# Patient Record
Sex: MALE | Race: WHITE | NOT HISPANIC OR LATINO
[De-identification: names, ages, dates, MRNs, and addresses within clinical notes are randomized per-mention and may not be internally consistent; named-entity substitution may affect disease eponyms.]

---

## 2017-01-13 ENCOUNTER — APPOINTMENT (OUTPATIENT)
Dept: OTOLARYNGOLOGY | Facility: CLINIC | Age: 32
End: 2017-01-13

## 2017-06-30 ENCOUNTER — APPOINTMENT (OUTPATIENT)
Dept: OTOLARYNGOLOGY | Facility: CLINIC | Age: 32
End: 2017-06-30

## 2017-09-27 ENCOUNTER — APPOINTMENT (OUTPATIENT)
Dept: OTOLARYNGOLOGY | Facility: CLINIC | Age: 32
End: 2017-09-27
Payer: COMMERCIAL

## 2017-09-27 VITALS
HEART RATE: 56 BPM | WEIGHT: 178 LBS | DIASTOLIC BLOOD PRESSURE: 77 MMHG | BODY MASS INDEX: 24.92 KG/M2 | SYSTOLIC BLOOD PRESSURE: 114 MMHG | HEIGHT: 71 IN

## 2017-09-27 DIAGNOSIS — D17.9 BENIGN LIPOMATOUS NEOPLASM, UNSPECIFIED: ICD-10-CM

## 2017-09-27 PROCEDURE — 31575 DIAGNOSTIC LARYNGOSCOPY: CPT

## 2017-09-27 PROCEDURE — 69210 REMOVE IMPACTED EAR WAX UNI: CPT

## 2017-09-27 PROCEDURE — 99214 OFFICE O/P EST MOD 30 MIN: CPT | Mod: 25

## 2017-12-01 ENCOUNTER — APPOINTMENT (OUTPATIENT)
Dept: OTOLARYNGOLOGY | Facility: CLINIC | Age: 32
End: 2017-12-01

## 2018-05-30 ENCOUNTER — APPOINTMENT (OUTPATIENT)
Dept: OTOLARYNGOLOGY | Facility: CLINIC | Age: 33
End: 2018-05-30
Payer: COMMERCIAL

## 2018-05-30 VITALS
WEIGHT: 178 LBS | BODY MASS INDEX: 24.92 KG/M2 | SYSTOLIC BLOOD PRESSURE: 103 MMHG | HEIGHT: 71 IN | HEART RATE: 69 BPM | DIASTOLIC BLOOD PRESSURE: 66 MMHG

## 2018-05-30 PROCEDURE — 99214 OFFICE O/P EST MOD 30 MIN: CPT | Mod: 25

## 2018-05-30 PROCEDURE — 69210 REMOVE IMPACTED EAR WAX UNI: CPT

## 2018-09-05 ENCOUNTER — APPOINTMENT (OUTPATIENT)
Dept: OTOLARYNGOLOGY | Facility: CLINIC | Age: 33
End: 2018-09-05
Payer: COMMERCIAL

## 2018-09-05 VITALS
BODY MASS INDEX: 25.2 KG/M2 | WEIGHT: 180 LBS | DIASTOLIC BLOOD PRESSURE: 68 MMHG | HEIGHT: 71 IN | SYSTOLIC BLOOD PRESSURE: 96 MMHG | HEART RATE: 60 BPM

## 2018-09-05 DIAGNOSIS — H91.90 UNSPECIFIED HEARING LOSS, UNSPECIFIED EAR: ICD-10-CM

## 2018-09-05 DIAGNOSIS — H66.90 OTITIS MEDIA, UNSPECIFIED, UNSPECIFIED EAR: ICD-10-CM

## 2018-09-05 DIAGNOSIS — H69.83 OTHER SPECIFIED DISORDERS OF EUSTACHIAN TUBE, BILATERAL: ICD-10-CM

## 2018-09-05 DIAGNOSIS — H61.20 IMPACTED CERUMEN, UNSPECIFIED EAR: ICD-10-CM

## 2018-09-05 PROCEDURE — 69210 REMOVE IMPACTED EAR WAX UNI: CPT

## 2018-09-05 PROCEDURE — 99214 OFFICE O/P EST MOD 30 MIN: CPT | Mod: 25

## 2020-12-05 ENCOUNTER — TRANSCRIPTION ENCOUNTER (OUTPATIENT)
Age: 35
End: 2020-12-05

## 2022-05-19 ENCOUNTER — APPOINTMENT (OUTPATIENT)
Dept: PLASTIC SURGERY | Facility: CLINIC | Age: 37
End: 2022-05-19

## 2022-10-17 ENCOUNTER — APPOINTMENT (OUTPATIENT)
Dept: CT IMAGING | Facility: CLINIC | Age: 37
End: 2022-10-17

## 2022-10-17 PROCEDURE — 70480 CT ORBIT/EAR/FOSSA W/O DYE: CPT

## 2023-01-25 ENCOUNTER — NON-APPOINTMENT (OUTPATIENT)
Age: 38
End: 2023-01-25

## 2023-08-29 ENCOUNTER — NON-APPOINTMENT (OUTPATIENT)
Age: 38
End: 2023-08-29

## 2023-11-06 ENCOUNTER — NON-APPOINTMENT (OUTPATIENT)
Age: 38
End: 2023-11-06

## 2024-02-20 ENCOUNTER — APPOINTMENT (OUTPATIENT)
Dept: ORTHOPEDIC SURGERY | Facility: CLINIC | Age: 39
End: 2024-02-20
Payer: COMMERCIAL

## 2024-02-20 ENCOUNTER — TRANSCRIPTION ENCOUNTER (OUTPATIENT)
Age: 39
End: 2024-02-20

## 2024-02-20 VITALS
SYSTOLIC BLOOD PRESSURE: 131 MMHG | DIASTOLIC BLOOD PRESSURE: 69 MMHG | HEIGHT: 71 IN | BODY MASS INDEX: 24.5 KG/M2 | HEART RATE: 60 BPM | WEIGHT: 175 LBS

## 2024-02-20 PROCEDURE — 99205 OFFICE O/P NEW HI 60 MIN: CPT

## 2024-02-20 NOTE — DISCUSSION/SUMMARY
[de-identified] : 39yM pw R knee repeat buckethandle tear of medial meniscus, ACL sprain.   The risks, benefits and alternatives to surgery were discussed.   We discussed that studies demonstrate the chondroprotective impact of an intact meniscus and that debridement of tissues may accelerate arthritis.  We discussed the short-term benefits often gained in pain control in debriding degenerative tears but that existing pain caused by cartilage loss will not be addressed in the scope of this surgery.  We explicitly discussed that an arthroscopic procedure is not guaranteed to improve his symptoms.  The patient understands the risks include but are not limited to bleeding, infection, wound healing issues, osteonecrosis of the knee, damage to surrounding structures including nerves and arteries, the need for revision surgery and retear, symptomatic hardware and the inability of the surgery to reduce the pain, arthritis and arthrosis.  No guarantees were given regarding the surgery.  They understand there is a risk of loss of limb, extremity and life.  We discussed the outcomes of revision meniscus repairs and that a future debridement may be required, however to protect his chondral health that my preference would be to conduct a revision repair.   We discussed the proposed rehabilitation timeline as well as expected postoperative restrictions. The patient voiced a good understanding of treatment options, risks and benefits, postoperative instructions, rehabilitation timeline, and restrictions. The patient was given the opportunity to ask questions, which were all answered to the best of my ability and to their satisfaction. The patient will work with my office to arrange a time for surgery and obtain any medical clearance information necessary. The patient expressed understanding of his diagnosis and treatment plan and all questions were answered.   I have personally obtained the history, reviewed the ROS as noted, and performed the physical examination today.  The patient and I discussed the assessment and options and developed the plan.  All questions were answered and the patient stated their understanding of the treatment plan and appreciation of the visit.  My cumulative time spent on this patient's visit included over 60 minutes: Preparation for the visit, review of the medical records, review of pertinent diagnostic studies, examination and counseling of the patient on the above diagnosis, treatment plan and prognosis, orders of diagnostic tests, medications and/or appropriate procedures and documentation in the medical records of today's visit.   Romain Thomas MD

## 2024-02-20 NOTE — HISTORY OF PRESENT ILLNESS
[de-identified] : 39y healthy M w hx of R knee medial meniscus repair 2020 Dr. Jack Walters pw R knee pain.  He is very active in [employer] and initially felt a pop with a locked knee in 2020 - he brings his operative report which notes 2 sutures utilized to repair the meniscus although he remembers from discussions with the surgery that a partial debridement was also conducted.  He was immediately WB and working with PT and while he had some clicking, he was doing fine until 2 weeks ago when he felt a repeat popping within his knee.  He feels locking/buckling, no n/p.  He had an MRI 1w ago demonstrating a repeat buckethandle tear, ACL sprain.

## 2024-02-20 NOTE — PHYSICAL EXAM
[de-identified] : Gen: NAD Resp: Nonlabored respirations, no SOB Gait: antalgic   R Knee: Skin intact small effusion 0-110 AROM Tender MJL 5/5 IP Q EHL TA GS SILT L3-S1 2+ dp pt wwp bcr   1A lachman and (-) pivot shift Grade 1 posterior drawer Stable to v/v at 0 and 30 degrees (-) Dial test at 30, 90 degrees   (+) Joselito, [place] Medial joint pain with shallow 2 leg squat   1+ patellar translation (-) pain with patellar compression/grind (-) J sign (-) apprehension  [de-identified] : I independently reviewed and interpreted the MRI of the R knee.  I discussed with the patient the MRI demonstrates an ACL sprain and medial meniscus buckethandle tear, small effusion and cyst.

## 2024-02-23 ENCOUNTER — OUTPATIENT (OUTPATIENT)
Dept: OUTPATIENT SERVICES | Facility: HOSPITAL | Age: 39
LOS: 1 days | End: 2024-02-23
Payer: COMMERCIAL

## 2024-02-23 VITALS
SYSTOLIC BLOOD PRESSURE: 114 MMHG | TEMPERATURE: 98 F | HEIGHT: 72 IN | WEIGHT: 177.03 LBS | OXYGEN SATURATION: 98 % | HEART RATE: 60 BPM | DIASTOLIC BLOOD PRESSURE: 76 MMHG | RESPIRATION RATE: 14 BRPM

## 2024-02-23 DIAGNOSIS — Z01.818 ENCOUNTER FOR OTHER PREPROCEDURAL EXAMINATION: ICD-10-CM

## 2024-02-23 DIAGNOSIS — M25.561 PAIN IN RIGHT KNEE: ICD-10-CM

## 2024-02-23 DIAGNOSIS — Z98.890 OTHER SPECIFIED POSTPROCEDURAL STATES: Chronic | ICD-10-CM

## 2024-02-23 DIAGNOSIS — J34.2 DEVIATED NASAL SEPTUM: Chronic | ICD-10-CM

## 2024-02-23 DIAGNOSIS — Z90.89 ACQUIRED ABSENCE OF OTHER ORGANS: Chronic | ICD-10-CM

## 2024-02-23 DIAGNOSIS — H72.93 UNSPECIFIED PERFORATION OF TYMPANIC MEMBRANE, BILATERAL: Chronic | ICD-10-CM

## 2024-02-23 DIAGNOSIS — Z96.22 MYRINGOTOMY TUBE(S) STATUS: Chronic | ICD-10-CM

## 2024-02-23 DIAGNOSIS — S83.249A OTHER TEAR OF MEDIAL MENISCUS, CURRENT INJURY, UNSPECIFIED KNEE, INITIAL ENCOUNTER: ICD-10-CM

## 2024-02-23 PROCEDURE — G0463: CPT

## 2024-02-23 NOTE — H&P PST ADULT - NSICDXPASTMEDICALHX_GEN_ALL_CORE_FT
PAST MEDICAL HISTORY:  Deviated septum     [place] (hard of hearing) R >L    Hypertrophy of tonsils     No pertinent past medical history     Perforated ear drum b/l

## 2024-02-23 NOTE — H&P PST ADULT - PROBLEM SELECTOR PLAN 1
right knee arthroscopy meniscus repair vs. debridement, possible microfracture; preop instructions given; to go for medical clearance

## 2024-02-23 NOTE — H&P PST ADULT - NSICDXPASTSURGICALHX_GEN_ALL_CORE_FT
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

## 2024-02-23 NOTE — H&P PST ADULT - NSICDXFAMILYHX_GEN_ALL_CORE_FT
FAMILY HISTORY:  Mother  Still living? Yes, Estimated age: 61-70  FHx: rheumatoid arthritis, Age at diagnosis: Age Unknown

## 2024-02-23 NOTE — H&P PST ADULT - ATTENDING COMMENTS
38yo M w hx of R knee medial meniscus tear pw buckethandle tear - plan for right knee arthroscopy, meniscus repair versus debridement, possible microfracture

## 2024-02-23 NOTE — H&P PST ADULT - HISTORY OF PRESENT ILLNESS
this is a 40 y/o male who had an injury on 2/14/24 and injured right knee; has had pain right knee since then, worse when walking, unable to straighten leg; to have right knee arthroscopy

## 2024-02-26 ENCOUNTER — TRANSCRIPTION ENCOUNTER (OUTPATIENT)
Age: 39
End: 2024-02-26

## 2024-02-27 ENCOUNTER — APPOINTMENT (OUTPATIENT)
Dept: ORTHOPEDIC SURGERY | Facility: HOSPITAL | Age: 39
End: 2024-02-27

## 2024-02-27 ENCOUNTER — TRANSCRIPTION ENCOUNTER (OUTPATIENT)
Age: 39
End: 2024-02-27

## 2024-02-27 ENCOUNTER — OUTPATIENT (OUTPATIENT)
Dept: OUTPATIENT SERVICES | Facility: HOSPITAL | Age: 39
LOS: 1 days | End: 2024-02-27
Payer: COMMERCIAL

## 2024-02-27 VITALS
OXYGEN SATURATION: 98 % | HEART RATE: 67 BPM | DIASTOLIC BLOOD PRESSURE: 65 MMHG | TEMPERATURE: 98 F | SYSTOLIC BLOOD PRESSURE: 117 MMHG | RESPIRATION RATE: 16 BRPM

## 2024-02-27 VITALS
DIASTOLIC BLOOD PRESSURE: 85 MMHG | WEIGHT: 173.94 LBS | OXYGEN SATURATION: 100 % | SYSTOLIC BLOOD PRESSURE: 126 MMHG | TEMPERATURE: 97 F | HEIGHT: 71 IN | RESPIRATION RATE: 16 BRPM | HEART RATE: 70 BPM

## 2024-02-27 DIAGNOSIS — H72.93 UNSPECIFIED PERFORATION OF TYMPANIC MEMBRANE, BILATERAL: Chronic | ICD-10-CM

## 2024-02-27 DIAGNOSIS — Z01.818 ENCOUNTER FOR OTHER PREPROCEDURAL EXAMINATION: ICD-10-CM

## 2024-02-27 DIAGNOSIS — Z90.89 ACQUIRED ABSENCE OF OTHER ORGANS: Chronic | ICD-10-CM

## 2024-02-27 DIAGNOSIS — Z98.890 OTHER SPECIFIED POSTPROCEDURAL STATES: Chronic | ICD-10-CM

## 2024-02-27 DIAGNOSIS — S83.249A OTHER TEAR OF MEDIAL MENISCUS, CURRENT INJURY, UNSPECIFIED KNEE, INITIAL ENCOUNTER: ICD-10-CM

## 2024-02-27 DIAGNOSIS — Z96.22 MYRINGOTOMY TUBE(S) STATUS: Chronic | ICD-10-CM

## 2024-02-27 DIAGNOSIS — J34.2 DEVIATED NASAL SEPTUM: Chronic | ICD-10-CM

## 2024-02-27 PROCEDURE — C1713: CPT

## 2024-02-27 PROCEDURE — ZZZZZ: CPT

## 2024-02-27 PROCEDURE — C9399: CPT

## 2024-02-27 PROCEDURE — 29882 ARTHRS KNE SRG MNISC RPR M/L: CPT | Mod: 22,RT

## 2024-02-27 PROCEDURE — 97161 PT EVAL LOW COMPLEX 20 MIN: CPT

## 2024-02-27 PROCEDURE — 29882 ARTHRS KNE SRG MNISC RPR M/L: CPT | Mod: RT

## 2024-02-27 DEVICE — SYS DVC AIR MENISCAL CURVED UP: Type: IMPLANTABLE DEVICE | Site: RIGHT | Status: FUNCTIONAL

## 2024-02-27 DEVICE — SYS DVC AIR PLUS MENISCAL CURVED DOWN: Type: IMPLANTABLE DEVICE | Site: RIGHT | Status: FUNCTIONAL

## 2024-02-27 RX ORDER — GABAPENTIN 400 MG/1
1 CAPSULE ORAL
Qty: 42 | Refills: 0
Start: 2024-02-27 | End: 2024-03-11

## 2024-02-27 RX ORDER — ASPIRIN/CALCIUM CARB/MAGNESIUM 324 MG
1 TABLET ORAL
Qty: 56 | Refills: 0
Start: 2024-02-27 | End: 2024-03-25

## 2024-02-27 RX ORDER — ONDANSETRON 8 MG/1
4 TABLET, FILM COATED ORAL ONCE
Refills: 0 | Status: DISCONTINUED | OUTPATIENT
Start: 2024-02-27 | End: 2024-02-28

## 2024-02-27 RX ORDER — ACETAMINOPHEN 500 MG
2 TABLET ORAL
Qty: 42 | Refills: 0
Start: 2024-02-27 | End: 2024-03-04

## 2024-02-27 RX ORDER — APREPITANT 80 MG/1
40 CAPSULE ORAL ONCE
Refills: 0 | Status: COMPLETED | OUTPATIENT
Start: 2024-02-27 | End: 2024-02-27

## 2024-02-27 RX ORDER — FENTANYL CITRATE 50 UG/ML
25 INJECTION INTRAVENOUS
Refills: 0 | Status: DISCONTINUED | OUTPATIENT
Start: 2024-02-27 | End: 2024-02-28

## 2024-02-27 RX ORDER — CEFAZOLIN SODIUM 1 G
2000 VIAL (EA) INJECTION ONCE
Refills: 0 | Status: COMPLETED | OUTPATIENT
Start: 2024-02-27 | End: 2024-02-27

## 2024-02-27 RX ORDER — IBUPROFEN 200 MG
1 TABLET ORAL
Qty: 56 | Refills: 0
Start: 2024-02-27 | End: 2024-03-11

## 2024-02-27 RX ORDER — SODIUM CHLORIDE 9 MG/ML
1000 INJECTION, SOLUTION INTRAVENOUS
Refills: 0 | Status: DISCONTINUED | OUTPATIENT
Start: 2024-02-27 | End: 2024-02-28

## 2024-02-27 RX ORDER — CHLORHEXIDINE GLUCONATE 213 G/1000ML
1 SOLUTION TOPICAL ONCE
Refills: 0 | Status: COMPLETED | OUTPATIENT
Start: 2024-02-27 | End: 2024-02-27

## 2024-02-27 RX ADMIN — FENTANYL CITRATE 25 MICROGRAM(S): 50 INJECTION INTRAVENOUS at 15:30

## 2024-02-27 RX ADMIN — CHLORHEXIDINE GLUCONATE 1 APPLICATION(S): 213 SOLUTION TOPICAL at 11:05

## 2024-02-27 RX ADMIN — APREPITANT 40 MILLIGRAM(S): 80 CAPSULE ORAL at 11:04

## 2024-02-27 RX ADMIN — FENTANYL CITRATE 25 MICROGRAM(S): 50 INJECTION INTRAVENOUS at 14:18

## 2024-02-27 RX ADMIN — SODIUM CHLORIDE 75 MILLILITER(S): 9 INJECTION, SOLUTION INTRAVENOUS at 14:13

## 2024-02-27 NOTE — ASU PATIENT PROFILE, ADULT - NS TRANSFER PROSTHESIS:
Discharge Planner PT   Patient plan for discharge: TCU not sure if [facility] or [facility] via medical transport  Current status:Pt amb 200' with ww with SBA with step to gait pattern.  Not met Pt transfers sit to / from stand with CGA . Pt transfers sit to/from supine with min assist with R LE.  Pt transfers bed to/ from chair with ww with SBA. Goals not met  Barriers to return to prior living situation: Pt does not have family able to assist, stairs to enter home and up to bedroom  Recommendations for discharge: TCU per plan established by the PT.    Rationale for recommendations: With continued skilled PT will regain Ind for return home with limited need for assist     PT - Pt discharging to TCU today per Ortho. Collaborated with PT - goals not met - please refer to discharge summary.       Entered by: Radha Sauer 12/10/2018 10:36 AM        N/A

## 2024-02-27 NOTE — BRIEF OPERATIVE NOTE - NSICDXBRIEFPREOP_GEN_ALL_CORE_FT
PRE-OP DIAGNOSIS:  Bucket handle tear of medial meniscus of right knee 27-Feb-2024 13:56:59  Ignacio Lou  
Ambulatory

## 2024-02-27 NOTE — ASU PATIENT PROFILE, ADULT - NS PRO PT RIGHT SUPPORT PERSON
same name as above Complex Repair And Flap Additional Text (Will Appearing After The Standard Complex Repair Text): The complex repair was not sufficient to completely close the primary defect. The remaining additional defect was repaired with the flap mentioned below.

## 2024-02-27 NOTE — ASU DISCHARGE PLAN (ADULT/PEDIATRIC) - ASU DC SPECIAL INSTRUCTIONSFT
Follow Surgeon's Instructions.  Weight Bearing Status: Right Lower Extremity is Non-Weight Bearing in a Knee Immobilizer. Keep leg straight in Extension.   Reduce activities as necessary. Use of assistive devices as necessary.    May ICE operative extremity to help with pain and swelling.  30 min on and 60 min off, several times a day.  Always use a barrier between skin and ice, such as a pillowcase or sheet to protect skin from thermal injury. may open brace when at rest to ice, but close and secure brace prior to any movement    Elevate operative Extremity to help reduce pain and swelling.        Keep Dressing Clean/Dry/Intact.  May Remove Dressing on POD#3. May Shower if no drainage from incision sites.   Brace must Remain dry.  Cover brace with cast bag or double wrapped plastic for protection when showering.         Follow Up in Office in 10-14 days. Surgeon will remove sutures at f/u visit.   Call office to confirm appointment.

## 2024-02-27 NOTE — ASU PATIENT PROFILE, ADULT - NSICDXPASTMEDICALHX_GEN_ALL_CORE_FT
PAST MEDICAL HISTORY:  Deviated septum     [place] (hard of hearing) R >L    Hypertrophy of tonsils     No pertinent past medical history     Perforated ear drum b/l

## 2024-02-27 NOTE — ASU DISCHARGE PLAN (ADULT/PEDIATRIC) - NS MD DC FALL RISK RISK
For information on Fall & Injury Prevention, visit: [email or web address] OR  [email or web address] OR  https://www.cdc.gov/steadi/patient.html

## 2024-02-27 NOTE — PHYSICAL THERAPY INITIAL EVALUATION ADULT - RANGE OF MOTION EXAMINATION, REHAB EVAL
right knee n/t due to surgical precautions/bilateral upper extremity ROM was WNL (within normal limits)/Left LE ROM was WNL (within normal limits)/deficits as listed below

## 2024-02-27 NOTE — ASU PATIENT PROFILE, ADULT - ABILITY TO HEAR (WITH HEARING AID OR HEARING APPLIANCE IF NORMALLY USED):
Bilateral hearing loss/Mildly to Moderately Impaired: difficulty hearing in some environments or speaker may need to increase volume or speak distinctly

## 2024-02-27 NOTE — BRIEF OPERATIVE NOTE - NSICDXBRIEFPROCEDURE_GEN_ALL_CORE_FT
PROCEDURES:  Arthroscopic repair of medial meniscus of right knee 27-Feb-2024 13:55:41 with microfracturing Ignacio Lou

## 2024-02-27 NOTE — ASU PATIENT PROFILE, ADULT - PRO ARRIVE FROM
[employer]. [employer]  - you can call but it takes a long time, you can schedule online for an appointment. [address] [facility], [address] [place] [address], [address] [place] [address], Darrell Velez 3  ii. [email or web address]  iv. [address] [facility] [address], [address] [facility], Geovanny Armando  ii. [phone] ext. [phone] Anali Shore) 17[address] [facility] [address] [facility], [place], 727 [facility]  ii. [phone]    o De'gerson Arriaga  i. Accepts multiple Medicaid plans  ii. [address], [address], [address] [place] [address], Luige Clarence 10  iii. [phone]  iv. [email or web address]    o [address], [address], [address] [place] [address]  ii. [phone]    o Freddie Hensley Psy.D.  [facility]. Accepts multiple Medicaid plans  ii. [address], Luige Clarence [address]  iii.  [address], [address], [address]  ii. [phone]    o Amandeep Brito' [address], [address] [place] [address]  ii. [phone] home

## 2024-02-27 NOTE — ASU DISCHARGE PLAN (ADULT/PEDIATRIC) - SHOWER ONLY DURATION DAY(S)
You may removed the bulky dressing on POD#3 and shower if no drainage from incision sites. Must Wear brace and keep protected with plastic bag.

## 2024-02-27 NOTE — ASU DISCHARGE PLAN (ADULT/PEDIATRIC) - CARE PROVIDER_API CALL
Romain Thomas.  Orthopaedic Surgery  [address] [facility], [address]  [place] [address]  Phone: [phone]  Fax: [phone]  Established Patient  Follow Up Time: 2 weeks

## 2024-02-27 NOTE — BRIEF OPERATIVE NOTE - NSICDXBRIEFPOSTOP_GEN_ALL_CORE_FT
POST-OP DIAGNOSIS:  Bucket handle tear of medial meniscus of right knee 27-Feb-2024 13:57:03  Ignacio Lou

## 2024-03-12 ENCOUNTER — APPOINTMENT (OUTPATIENT)
Dept: ORTHOPEDIC SURGERY | Facility: CLINIC | Age: 39
End: 2024-03-12
Payer: COMMERCIAL

## 2024-03-12 VITALS — SYSTOLIC BLOOD PRESSURE: 110 MMHG | DIASTOLIC BLOOD PRESSURE: 72 MMHG | HEART RATE: 101 BPM

## 2024-03-12 PROCEDURE — 99024 POSTOP FOLLOW-UP VISIT: CPT

## 2024-03-12 NOTE — HISTORY OF PRESENT ILLNESS
[de-identified] : 39y healthy M w hx of R knee medial meniscus repair 2020 Dr. Jack Walters pw R knee pain.  He is very active in [employer] and initially felt a pop with a locked knee in 2020 - he brings his operative report which notes 2 sutures utilized to repair the meniscus although he remembers from discussions with the surgery that a partial debridement was also conducted.  He was immediately WB and working with PT and while he had some clicking, he was doing fine until 2 weeks ago when he felt a repeat popping within his knee.  He feels locking/buckling, no n/p.  He had an MRI 1w ago demonstrating a repeat buckethandle tear, ACL sprain.  3/12 interval - Pt returns after surgery stating the pain has gradually decreased each day.  Pt denies f/c, cp/sob, numbness/paresthesias, has followed postop instructions regarding rom and weight bearing status, has weaned prescription pain meds almost completely.  No issues with the dressing or wound.

## 2024-03-12 NOTE — DISCUSSION/SUMMARY
[de-identified] : 39yM pw R knee repeat buckethandle tear of medial meniscus, ACL sprain now sp repair. -asa -nwb 4w -hinged knee brace -PT -rtc 4-6w   We discussed the proposed rehabilitation timeline as well as expected postoperative restrictions. The patient voiced a good understanding of treatment options, risks and benefits, postoperative instructions, rehabilitation timeline, and restrictions. The patient was given the opportunity to ask questions, which were all answered to the best of my ability and to their satisfaction. The patient will work with my office to arrange a time for surgery and obtain any medical clearance information necessary. The patient expressed understanding of his diagnosis and treatment plan and all questions were answered.   I have personally obtained the history, reviewed the ROS as noted, and performed the physical examination today.  The patient and I discussed the assessment and options and developed the plan.  All questions were answered and the patient stated their understanding of the treatment plan and appreciation of the visit.  My cumulative time spent on this patient's visit included over 60 minutes: Preparation for the visit, review of the medical records, review of pertinent diagnostic studies, examination and counseling of the patient on the above diagnosis, treatment plan and prognosis, orders of diagnostic tests, medications and/or appropriate procedures and documentation in the medical records of today's visit.   Romain Thomas MD

## 2024-03-12 NOTE — PHYSICAL EXAM
[de-identified] : Gen: NAD Resp: Nonlabored respirations, no SOB Gait: antalgic, steady   Knee: Incision cdi, no s/s of infx Small effusion 10-70 AROM Nontender MJL/LJL Firing IP Q EHL TA GS SILT L3-S1 2+ dp pt wwp bcr   1A lachman and (-) pivot shift Grade 1 posterior drawer Stable to v/v at 0 and 30 degrees (-) Dial test at 30, 90 degrees     1+ patellar translation (-) pain with patellar compression/grind (-) apprehension  [de-identified] : I independently reviewed and interpreted the MRI of the R knee.  I discussed with the patient the MRI demonstrates an ACL sprain and medial meniscus buckethandle tear, small effusion and cyst.

## 2024-03-13 RX ORDER — GABAPENTIN 100 MG/1
100 CAPSULE ORAL
Qty: 28 | Refills: 0 | Status: ACTIVE | COMMUNITY
Start: 2024-03-13 | End: 1900-01-01

## 2024-03-26 DIAGNOSIS — M79.2 NEURALGIA AND NEURITIS, UNSPECIFIED: ICD-10-CM

## 2024-03-26 RX ORDER — GABAPENTIN 100 MG/1
100 CAPSULE ORAL
Qty: 21 | Refills: 0 | Status: ACTIVE | COMMUNITY
Start: 2024-03-26 | End: 1900-01-01

## 2024-04-11 ENCOUNTER — APPOINTMENT (OUTPATIENT)
Dept: ORTHOPEDIC SURGERY | Facility: CLINIC | Age: 39
End: 2024-04-11
Payer: COMMERCIAL

## 2024-04-11 VITALS — HEART RATE: 81 BPM | DIASTOLIC BLOOD PRESSURE: 63 MMHG | SYSTOLIC BLOOD PRESSURE: 111 MMHG

## 2024-04-11 PROCEDURE — 99024 POSTOP FOLLOW-UP VISIT: CPT

## 2024-04-11 NOTE — DISCUSSION/SUMMARY
[de-identified] : 39yM pw R knee repeat buckethandle tear of medial meniscus, ACL sprain now sp buckethandle meniscus repair. -asa complete -wbat -hinged knee brace -PT -rtc 2m   We discussed the proposed rehabilitation timeline as well as expected postoperative restrictions. The patient voiced a good understanding of treatment options, risks and benefits, postoperative instructions, rehabilitation timeline, and restrictions. The patient was given the opportunity to ask questions, which were all answered to the best of my ability and to their satisfaction. The patient will work with my office to arrange a time for surgery and obtain any medical clearance information necessary. The patient expressed understanding of his diagnosis and treatment plan and all questions were answered.   I have personally obtained the history, reviewed the ROS as noted, and performed the physical examination today.  The patient and I discussed the assessment and options and developed the plan.  All questions were answered and the patient stated their understanding of the treatment plan and appreciation of the visit.  My cumulative time spent on this patient's visit included over 60 minutes: Preparation for the visit, review of the medical records, review of pertinent diagnostic studies, examination and counseling of the patient on the above diagnosis, treatment plan and prognosis, orders of diagnostic tests, medications and/or appropriate procedures and documentation in the medical records of today's visit.   Romain Thomas MD

## 2024-04-11 NOTE — PHYSICAL EXAM
[de-identified] : Gen: NAD Resp: Nonlabored respirations, no SOB Gait: antalgic, steady   Knee: Incision cdi, no s/s of infx No  effusion 0-100 AROM Nontender MJL/LJL Firing IP Q EHL TA GS SILT L3-S1 2+ dp pt wwp bcr   1A lachman and (-) pivot shift Grade 1 posterior drawer Stable to v/v at 0 and 30 degrees (-) Dial test at 30, 90 degrees     1+ patellar translation (-) pain with patellar compression/grind (-) apprehension  [de-identified] : I independently reviewed and interpreted the MRI of the R knee.  I discussed with the patient the MRI demonstrates an ACL sprain and medial meniscus buckethandle tear, small effusion and cyst.

## 2024-04-11 NOTE — HISTORY OF PRESENT ILLNESS
[de-identified] : 39y healthy M w hx of R knee medial meniscus repair 2020 Dr. Jack Walters pw R knee pain.  He is very active in [employer] and initially felt a pop with a locked knee in 2020 - he brings his operative report which notes 2 sutures utilized to repair the meniscus although he remembers from discussions with the surgery that a partial debridement was also conducted.  He was immediately WB and working with PT and while he had some clicking, he was doing fine until 2 weeks ago when he felt a repeat popping within his knee.  He feels locking/buckling, no n/p.  He had an MRI 1w ago demonstrating a repeat buckethandle tear, ACL sprain.  3/12 interval - Pt returns after surgery stating the pain has gradually decreased each day.  Pt denies f/c, cp/sob, numbness/paresthesias, has followed postop instructions regarding rom and weight bearing status, has weaned prescription pain meds almost completely.  No issues with the dressing or wound.   4/11 interval - some stiffness with PT but feels this recovery is worlds better than last surgery, started WB 1 w ago, going to [place] next week

## 2024-04-18 ENCOUNTER — NON-APPOINTMENT (OUTPATIENT)
Age: 39
End: 2024-04-18

## 2024-06-11 ENCOUNTER — APPOINTMENT (OUTPATIENT)
Dept: ORTHOPEDIC SURGERY | Facility: CLINIC | Age: 39
End: 2024-06-11
Payer: COMMERCIAL

## 2024-06-11 VITALS
DIASTOLIC BLOOD PRESSURE: 68 MMHG | WEIGHT: 175 LBS | HEART RATE: 74 BPM | SYSTOLIC BLOOD PRESSURE: 97 MMHG | HEIGHT: 71 IN | BODY MASS INDEX: 24.5 KG/M2

## 2024-06-11 DIAGNOSIS — S83.249A OTHER TEAR OF MEDIAL MENISCUS, CURRENT INJURY, UNSPECIFIED KNEE, INITIAL ENCOUNTER: ICD-10-CM

## 2024-06-11 PROCEDURE — 99213 OFFICE O/P EST LOW 20 MIN: CPT

## 2024-06-11 NOTE — PHYSICAL EXAM
[de-identified] : Gen: NAD Resp: Nonlabored respirations, no SOB Gait: antalgic, steady   Knee: Incision healed  No  effusion 0-125 AROM Nontender MJL/LJL 5/5 IP Q EHL TA GS SILT L3-S1 2+ dp pt wwp bcr   1A lachman and (-) pivot shift Grade 1 posterior drawer Stable to v/v at 0 and 30 degrees (-) Dial test at 30, 90 degrees    (-) marcia Yee  1+ patellar translation (-) pain with patellar compression/grind (-) apprehension  [de-identified] : I independently reviewed and interpreted the MRI of the R knee.  I discussed with the patient the MRI demonstrates an ACL sprain and medial meniscus buckethandle tear, small effusion and cyst.

## 2024-06-11 NOTE — DISCUSSION/SUMMARY
[de-identified] : 39yM pw R knee repeat buckethandle tear of medial meniscus, ACL sprain now sp buckethandle meniscus repair. -asa complete -wbat -dc brace -PT -rtc 2m - rt sport eval   We discussed the proposed rehabilitation timeline as well as expected postoperative restrictions. The patient voiced a good understanding of treatment options, risks and benefits, postoperative instructions, rehabilitation timeline, and restrictions. The patient was given the opportunity to ask questions, which were all answered to the best of my ability and to their satisfaction. The patient will work with my office to arrange a time for surgery and obtain any medical clearance information necessary. The patient expressed understanding of his diagnosis and treatment plan and all questions were answered.   I have personally obtained the history, reviewed the ROS as noted, and performed the physical examination today.  The patient and I discussed the assessment and options and developed the plan.  All questions were answered and the patient stated their understanding of the treatment plan and appreciation of the visit.  My cumulative time spent on this patient's visit included over 60 minutes: Preparation for the visit, review of the medical records, review of pertinent diagnostic studies, examination and counseling of the patient on the above diagnosis, treatment plan and prognosis, orders of diagnostic tests, medications and/or appropriate procedures and documentation in the medical records of today's visit.   Romain Thomas MD

## 2024-06-11 NOTE — HISTORY OF PRESENT ILLNESS
[de-identified] : 39y healthy M w hx of R knee medial meniscus repair 2020 Dr. Jack Walters pw R knee pain.  He is very active in [employer] and initially felt a pop with a locked knee in 2020 - he brings his operative report which notes 2 sutures utilized to repair the meniscus although he remembers from discussions with the surgery that a partial debridement was also conducted.  He was immediately WB and working with PT and while he had some clicking, he was doing fine until 2 weeks ago when he felt a repeat popping within his knee.  He feels locking/buckling, no n/p.  He had an MRI 1w ago demonstrating a repeat buckethandle tear, ACL sprain.  3/12 interval - Pt returns after surgery stating the pain has gradually decreased each day.  Pt denies f/c, cp/sob, numbness/paresthesias, has followed postop instructions regarding rom and weight bearing status, has weaned prescription pain meds almost completely.  No issues with the dressing or wound.   4/11 interval - some stiffness with PT but feels this recovery is worlds better than last surgery, started WB 1 w ago, going to [place] next week  6/11 interval - 0/10 pain, progressing with PT and happy with function - going to start increasing strengthening and planning on returning to grappling 2m Tenderness with knee flexion > 120 degrees

## 2024-07-30 NOTE — BRIEF OPERATIVE NOTE - COMMENTS
Initial (On Arrival) Patient tolerated the procedure well and was transported to the PACU in stable condition. PA present for 100% of case.  Patient may be discharged home per PACU protocol.

## 2024-08-13 ENCOUNTER — APPOINTMENT (OUTPATIENT)
Dept: ORTHOPEDIC SURGERY | Facility: CLINIC | Age: 39
End: 2024-08-13
Payer: COMMERCIAL

## 2024-08-13 VITALS — DIASTOLIC BLOOD PRESSURE: 70 MMHG | HEART RATE: 68 BPM | SYSTOLIC BLOOD PRESSURE: 107 MMHG

## 2024-08-13 DIAGNOSIS — S83.249A OTHER TEAR OF MEDIAL MENISCUS, CURRENT INJURY, UNSPECIFIED KNEE, INITIAL ENCOUNTER: ICD-10-CM

## 2024-08-13 PROCEDURE — 99213 OFFICE O/P EST LOW 20 MIN: CPT

## 2024-08-13 NOTE — PHYSICAL EXAM
[de-identified] : Gen: NAD Resp: Nonlabored respirations, no SOB Gait: antalgic, steady   Knee: Incision healed  No  effusion 0-125 AROM Nontender MJL/LJL 5/5 IP Q EHL TA GS SILT L3-S1 2+ dp pt wwp bcr   1A lachman and (-) pivot shift Grade 1 posterior drawer Stable to v/v at 0 and 30 degrees (-) Dial test at 30, 90 degrees    (-) marcia Yee  1+ patellar translation (-) pain with patellar compression/grind (-) apprehension  [de-identified] : I independently reviewed and interpreted the MRI of the R knee.  I discussed with the patient the MRI demonstrates an ACL sprain and medial meniscus buckethandle tear, small effusion and cyst.

## 2024-08-13 NOTE — HISTORY OF PRESENT ILLNESS
[de-identified] : 39y healthy M w hx of R knee medial meniscus repair 2020 Dr. Jack Walters pw R knee pain.  He is very active in [email or web address] and initially felt a pop with a locked knee in 2020 - he brings his operative report which notes 2 sutures utilized to repair the meniscus although he remembers from discussions with the surgery that a partial debridement was also conducted.  He was immediately WB and working with PT and while he had some clicking, he was doing fine until 2 weeks ago when he felt a repeat popping within his knee.  He feels locking/buckling, no n/p.  He had an MRI 1w ago demonstrating a repeat buckethandle tear, ACL sprain.  3/12 interval - Pt returns after surgery stating the pain has gradually decreased each day.  Pt denies f/c, cp/sob, numbness/paresthesias, has followed postop instructions regarding rom and weight bearing status, has weaned prescription pain meds almost completely.  No issues with the dressing or wound.   4/11 interval - some stiffness with PT but feels this recovery is worlds better than last surgery, started WB 1 w ago, going to [place] next week  6/11 interval - 0/10 pain, progressing with PT and happy with function - going to start increasing strengthening and planning on returning to grappling 2m Tenderness with knee flexion > 120 degrees  8/13 interval - feels great strength and progressing with PT, does not feel quite ready for [facility] yet.  Feels medial tightness when kneeling
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
## 2024-10-15 ENCOUNTER — APPOINTMENT (OUTPATIENT)
Dept: ORTHOPEDIC SURGERY | Facility: CLINIC | Age: 39
End: 2024-10-15
Payer: COMMERCIAL

## 2024-10-15 DIAGNOSIS — S83.249A OTHER TEAR OF MEDIAL MENISCUS, CURRENT INJURY, UNSPECIFIED KNEE, INITIAL ENCOUNTER: ICD-10-CM

## 2024-10-15 PROCEDURE — 99213 OFFICE O/P EST LOW 20 MIN: CPT

## 2024-12-06 ENCOUNTER — EMERGENCY (EMERGENCY)
Facility: HOSPITAL | Age: 39
LOS: 1 days | Discharge: HOME | End: 2024-12-06
Attending: STUDENT IN AN ORGANIZED HEALTH CARE EDUCATION/TRAINING PROGRAM | Admitting: STUDENT IN AN ORGANIZED HEALTH CARE EDUCATION/TRAINING PROGRAM
Payer: COMMERCIAL

## 2024-12-06 VITALS
RESPIRATION RATE: 18 BRPM | HEIGHT: 71 IN | OXYGEN SATURATION: 99 % | DIASTOLIC BLOOD PRESSURE: 76 MMHG | TEMPERATURE: 98 F | WEIGHT: 185.19 LBS | HEART RATE: 100 BPM | SYSTOLIC BLOOD PRESSURE: 139 MMHG

## 2024-12-06 VITALS
HEART RATE: 68 BPM | OXYGEN SATURATION: 98 % | SYSTOLIC BLOOD PRESSURE: 115 MMHG | DIASTOLIC BLOOD PRESSURE: 73 MMHG | TEMPERATURE: 98 F | RESPIRATION RATE: 18 BRPM

## 2024-12-06 DIAGNOSIS — Z98.890 OTHER SPECIFIED POSTPROCEDURAL STATES: Chronic | ICD-10-CM

## 2024-12-06 DIAGNOSIS — H72.93 UNSPECIFIED PERFORATION OF TYMPANIC MEMBRANE, BILATERAL: Chronic | ICD-10-CM

## 2024-12-06 DIAGNOSIS — Z96.22 MYRINGOTOMY TUBE(S) STATUS: Chronic | ICD-10-CM

## 2024-12-06 DIAGNOSIS — J34.2 DEVIATED NASAL SEPTUM: Chronic | ICD-10-CM

## 2024-12-06 DIAGNOSIS — Z90.89 ACQUIRED ABSENCE OF OTHER ORGANS: Chronic | ICD-10-CM

## 2024-12-06 PROCEDURE — 72100 X-RAY EXAM L-S SPINE 2/3 VWS: CPT | Mod: 26

## 2024-12-06 PROCEDURE — 70450 CT HEAD/BRAIN W/O DYE: CPT | Mod: MC

## 2024-12-06 PROCEDURE — 99285 EMERGENCY DEPT VISIT HI MDM: CPT

## 2024-12-06 PROCEDURE — 99284 EMERGENCY DEPT VISIT MOD MDM: CPT | Mod: 25

## 2024-12-06 PROCEDURE — 72125 CT NECK SPINE W/O DYE: CPT | Mod: 26,MC

## 2024-12-06 PROCEDURE — 72125 CT NECK SPINE W/O DYE: CPT | Mod: MC

## 2024-12-06 PROCEDURE — 70450 CT HEAD/BRAIN W/O DYE: CPT | Mod: 26,MC

## 2024-12-06 PROCEDURE — 72100 X-RAY EXAM L-S SPINE 2/3 VWS: CPT

## 2024-12-06 RX ORDER — ACETAMINOPHEN 500MG 500 MG/1
650 TABLET, COATED ORAL ONCE
Refills: 0 | Status: COMPLETED | OUTPATIENT
Start: 2024-12-06 | End: 2024-12-06

## 2024-12-06 RX ADMIN — ACETAMINOPHEN 500MG 650 MILLIGRAM(S): 500 TABLET, COATED ORAL at 22:50

## 2024-12-06 NOTE — ED ADULT NURSE REASSESSMENT NOTE - NS ED NURSE REASSESS COMMENT FT1
Pt is awake, alert, conversant, sitting up on stretcher. Results of imaging d/w pt by the MD. Plan for d/c, neurosurg consult for abnormal cervical spine CT
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
## 2024-12-06 NOTE — ED ADULT NURSE NOTE - NSFALLUNIVINTERV_ED_ALL_ED
Bed/Stretcher in lowest position, wheels locked, appropriate side rails in place/Call bell, personal items and telephone in reach/Instruct patient to call for assistance before getting out of bed/chair/stretcher/Non-slip footwear applied when patient is off stretcher/[place] to call system/Physically safe environment - no spills, clutter or unnecessary equipment/Purposeful proactive rounding/Room/bathroom lighting operational, light cord in reach

## 2024-12-07 RX ADMIN — ACETAMINOPHEN 500MG 650 MILLIGRAM(S): 500 TABLET, COATED ORAL at 00:15

## 2024-12-07 NOTE — ED PROVIDER NOTE - NSICDXPASTMEDICALHX_GEN_ALL_CORE_FT
PAST MEDICAL HISTORY:  Deviated septum     [place] (hard of hearing) R >L    Hypertrophy of tonsils     No pertinent past medical history     Perforated ear drum b/l

## 2024-12-07 NOTE — ED PROVIDER NOTE - MUSCULOSKELETAL, MLM
Spine appears normal, range of motion is not limited, no muscle or joint tenderness. Gait steady, hips and pelvis stable.  No LS spine tenderness, deformity, or step-off. Normal LE sensation, +LE pulses, no saddle anesthesia
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
## 2024-12-07 NOTE — ED PROVIDER NOTE - CARE PROVIDERS DIRECT ADDRESSES
[email or web address]

## 2024-12-07 NOTE — ED PROVIDER NOTE - PROGRESS NOTE DETAILS
Discussed results of CT and x-ray with patient.  He is aware of abnormal CT c-spine findings.  He states no known history of herniated discs or trauma. Denies UE tingling, weakness to hands,  Advised to follow up with spine and neurosurgery.  Copies of all reports given.  Return precautions discussed.  Patient expressed understanding of discharge instructions.

## 2024-12-07 NOTE — ED PROVIDER NOTE - PHYSICAL EXAMINATION
No swelling to face, lips, tongue, jaw.  No dental injury.  No scalp swelling or hematoma  Full ROM of c-spine, no midline tenderness or deformity

## 2024-12-07 NOTE — ED PROVIDER NOTE - DIFFERENTIAL DIAGNOSIS
Differential Diagnosis Ddx includes but not limited to fracture, herniated disc, lumbar/cervical strain, concussion, hematoma, ICH
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
## 2024-12-07 NOTE — ED PROVIDER NOTE - CARE PROVIDER_API CALL
Lencho Cramer  Internal Medicine  [address] [place] [id number]  Phone: [phone]  Fax: [phone]  Follow Up Time:     Robert Power Chi  Neurosurgery  [address] [facility]  [place] [address]  Phone: [phone]  Fax: [phone]  Follow Up Time:

## 2024-12-07 NOTE — ED PROVIDER NOTE - PATIENT PORTAL LINK FT
You can access the FollowMyHealth Patient Portal offered by [facility] by registering at the following website: [email or web address]. By joining [email or web address]’s FollowMyHealth portal, you will also be able to view your health information using other applications (apps) compatible with our system.

## 2024-12-07 NOTE — ED PROVIDER NOTE - NOTES
Abnormal CT findings d/w neurosurgery.  States very likely chronic findings.  As long as patient has not UE tingling/weakness in hands, safe to discharge with spine surgery follow up
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
## 2025-03-25 ENCOUNTER — APPOINTMENT (OUTPATIENT)
Dept: ORTHOPEDIC SURGERY | Facility: CLINIC | Age: 40
End: 2025-03-25
Payer: COMMERCIAL

## 2025-03-25 DIAGNOSIS — S83.249A OTHER TEAR OF MEDIAL MENISCUS, CURRENT INJURY, UNSPECIFIED KNEE, INITIAL ENCOUNTER: ICD-10-CM

## 2025-03-25 PROCEDURE — 99213 OFFICE O/P EST LOW 20 MIN: CPT

## (undated) DEVICE — DRAPE U 47X51" LF STERILE

## (undated) DEVICE — SPECIMEN CONTAINER 4OZ

## (undated) DEVICE — TOURNIQUET ESMARK 6"

## (undated) DEVICE — CUTTER BONE TOMCAT 4MM

## (undated) DEVICE — S&N ARTHROCARE WAND COBLATION WEREWOLF FLOW 50

## (undated) DEVICE — LAP PAD W RING 18 X 18"

## (undated) DEVICE — BRACE KNEE IMMOBILIZER KNEE SPLINT SUPER 22"

## (undated) DEVICE — GLV 7.5 PROTEXIS (WHITE)

## (undated) DEVICE — TUBING DEPUY MITEK FMS VUE INFLOW

## (undated) DEVICE — DRSG ACE BANDAGE 6"

## (undated) DEVICE — KNOT PUSHER SUTURE CUTTER AND SLED AIR DISP

## (undated) DEVICE — TUBING CROSSFLOW INFLOW

## (undated) DEVICE — SUT MONOSOF 3-0 18" C-14

## (undated) DEVICE — GLV 8 PROTEXIS (BLUE)

## (undated) DEVICE — SYR LUER LOK 10CC

## (undated) DEVICE — TUBING DEPUY MITEK FMS OUTFLOW

## (undated) DEVICE — SOL IRR POUR NS 0.9% 1000ML

## (undated) DEVICE — WARMING BLANKET UPPER ADULT

## (undated) DEVICE — Device

## (undated) DEVICE — VENODYNE/SCD SLEEVE CALF MEDIUM

## (undated) DEVICE — TUBING SUCTION 20FT

## (undated) DEVICE — LAP PAD 18 X 18"

## (undated) DEVICE — TOURNIQUET CUFF 30" DUAL PORT W PLC

## (undated) DEVICE — TOURNIQUET CUFF 34" DUAL PORT W PLC

## (undated) DEVICE — SHAVER BLADE LINVATEC ULTRAFRR 3.5MM

## (undated) DEVICE — NDL SPINAL 18G X 3.5" (PINK)

## (undated) DEVICE — STRYKER SERFAS 50-S SWEEP PROBE 3.5 X 135MM

## (undated) DEVICE — PACK KNEE ARTHROSCOPY

## (undated) DEVICE — DRSG COMBINE 5X9"

## (undated) DEVICE — TUBING CROSSFLOW OUTFLOW

## (undated) DEVICE — DRAPE 3/4 SHEET 52X76"